# Patient Record
Sex: FEMALE | Race: WHITE
[De-identification: names, ages, dates, MRNs, and addresses within clinical notes are randomized per-mention and may not be internally consistent; named-entity substitution may affect disease eponyms.]

---

## 2020-02-20 ENCOUNTER — HOSPITAL ENCOUNTER (EMERGENCY)
Dept: HOSPITAL 25 - UCEAST | Age: 50
Discharge: HOME | End: 2020-02-20
Payer: COMMERCIAL

## 2020-02-20 VITALS — SYSTOLIC BLOOD PRESSURE: 138 MMHG | DIASTOLIC BLOOD PRESSURE: 80 MMHG
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
## 2020-02-20 DIAGNOSIS — S52.591A: Primary | ICD-10-CM
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
## 2020-02-20 PROCEDURE — 99213 OFFICE O/P EST LOW 20 MIN: CPT

## 2020-02-20 PROCEDURE — G0463 HOSPITAL OUTPT CLINIC VISIT: HCPCS

## 2020-02-20 NOTE — UC
Hand/Wrist HPI





- HPI Summary


HPI Summary: 


49-year-old woman comes in with a chief complaint of right wrist pain after a 

slip and fall this morning.  Denies any other injuries.  She is able to move 

her fingers no loss of sensation.  Denies any elbow or shoulder pain or any 

other injuries.  Pain is worse with any kind of movement.  Pain is less with no 

movement and the ice provided here in clinic.





- History Of Current Complaint


Chief Complaint: UCUpperExtremity


Stated Complaint: WRIST INJURY


Time Seen by Provider: 20 08:05


Pain Intensity: 10





- Allergies/Home Medications


Allergies/Adverse Reactions: 


 Allergies











Allergy/AdvReac Type Severity Reaction Status Date / Time


 


No Known Allergies Allergy   Verified 20 07:35











Home Medications: 


 Home Medications





Fluticasone NASAL SPRAY 50MCG* [Flonase NASAL SPRAY 50MCG*] 1 spray INH ONCE 

PRN 20 [History Confirmed 20]











PMH/Surg Hx/FS Hx/Imm Hx


Previously Healthy: Yes





- Surgical History


Surgical History: Yes


Surgery Procedure, Year, and Place: ectopic pregnancy





- Family History


Known Family History: Positive: Non-Contributory





- Social History


Alcohol Use: None


Substance Use Type: None


Smoking Status (MU): Never Smoked Tobacco





Review of Systems


All Other Systems Reviewed And Are Negative: Yes


Constitutional: Positive: Negative


Skin: Positive: Negative


Eyes: Positive: Negative


ENT: Positive: Negative


Respiratory: Positive: Negative


Cardiovascular: Positive: Negative


Gastrointestinal: Positive: Negative


Motor: Positive: Other - see hpi


Neurovascular: Positive: Negative


Musculoskeletal: Positive: Other: - see hpi


Neurological/Mental Status: Positive: Negative


Psychological: Positive: Negative


Is Patient Immunocompromised?: No





Physical Exam


Triage Information Reviewed: Yes


Appearance: Well-Appearing, Well-Nourished, Pain Distress - mild with exam and 

rom rt wrist


Vital Signs: 


 Initial Vital Signs











Temp  98 F   20 07:31


 


Pulse  69   20 07:31


 


Resp  18   20 07:31


 


BP  138/80   20 07:31


 


Pulse Ox  96   20 07:31











Vital Signs Reviewed: Yes


Eye Exam: Normal


Eyes: Positive: Conjunctiva Clear


Neck: Positive: Supple


Respiratory: Positive: No respiratory distress


Musculoskeletal: Positive: Other: - Right wrist is tender to palpation and 

swollen.  Patient can move her fingers well without causes pain in the wrist.  

Patient declines moving the wrist due to pain.  Normal capillary refill normal 

sensation in the hand.


Neurological: Positive: Alert


Psychological: Positive: Age Appropriate Behavior


Skin Exam: Normal





Hand/Wrist Course/Dx





- Course


Course Of Treatment: 





: Juan Crews Daniel, ([id number]) : NUANCE, (

NUANCE) Report Date: 2020 08:23:00 Report Status: Final ======

======================================== Start of Report Content ===============

===============================  Patient Name: ROCKY COHEN Medical Record#: 

[id number] Ordering Physician: Cristino Schaeffer MD Acct.#: [id number] :  Age: 49 Sex: F Location: [facility] Exam Date: 20 ADM Status: REG ER Order Information: WRIST RIGHT 3+ VWS Accession Number: 

[id number] CPT: 18908 HISTORY: pain s/p fall . COMPARISONS: None relevant 

available at the time of dictation. VIEWS: 3, Frontal, lateral, and oblique 

views of the right wrist FINDINGS: BONE DENSITY: Normal. BONES: There is a 

comminuted, dorsally inflated fracture of the distal radial metaphysis with 

articular extension. There is approximately 40 degrees of dorsal angulation. 

JOINTS: There is no arthropathy. ALIGNMENT: There is no dislocation. SOFT 

TISSUES: Unremarkable. OTHER FINDINGS: None. IMPRESSION: DORSALLY ANGULATED 

FRACTURE OF THE DISTAL RADIAL METAPHYSIS. ______________________________________

______________________ <Electronically signed by Juan Crews MD in OV> 

20 Dictated By: Juan Crews MD Dictated Date/Time: 20 Transcribed Date/Time: 20 Copy to: CC:Elif Martines MD; Cristino Schaeffer MD Imaging - [facility] - [facility] - 

[facility] [address] [facility] [address] [place] [address] [place] [address] [place] [address] ph [phone] ph [phone]) ph [phone])   ============================================== 

End of Report Content ==============================================





Due to the angulation of the injury nursing called orthopedics and orthopedics 

asked for the patient to be seen by them now in the office.  Patient was placed 

in a cockup splint by nursing here for protection between here and going 

directly to the orthopedic office.  Patient neurovascular intact after 

placement of the cock-up splint.  Patient is going directly to orthopedics from 

the clinic.





- Differential Dx/Diagnosis


Provider Diagnosis: 


 Wrist fracture, right








Discharge ED





- Sign-Out/Discharge


Documenting (check all that apply): Patient Departure


All imaging exams completed and their final reports reviewed: Yes





- Discharge Plan


Condition: Stable


Disposition: HOME


Patient Education Materials:  Wrist Fracture in Adults (ED)


Referrals: 


Elif Martines MD [Primary Care Provider] - 


Magnolia Cintron MD [Medical Doctor] - 


Additional Instructions: 


 DIRECTLY TO THE ORTHOPEDIC OFFICE FOR FURTHER EVALUATION AND CARE.





- Billing Disposition and Condition


Condition: STABLE


Disposition: Home